# Patient Record
Sex: FEMALE | Race: WHITE | HISPANIC OR LATINO | Employment: FULL TIME | URBAN - METROPOLITAN AREA
[De-identification: names, ages, dates, MRNs, and addresses within clinical notes are randomized per-mention and may not be internally consistent; named-entity substitution may affect disease eponyms.]

---

## 2023-08-07 ENCOUNTER — OFFICE VISIT (OUTPATIENT)
Dept: OBGYN | Facility: CLINIC | Age: 28
End: 2023-08-07
Payer: COMMERCIAL

## 2023-08-07 VITALS — SYSTOLIC BLOOD PRESSURE: 100 MMHG | WEIGHT: 126.2 LBS | DIASTOLIC BLOOD PRESSURE: 56 MMHG

## 2023-08-07 DIAGNOSIS — O35.9XX0 KNOWN FETAL ANOMALY, ANTEPARTUM, SINGLE OR UNSPECIFIED FETUS: ICD-10-CM

## 2023-08-07 DIAGNOSIS — Z03.72 PLACENTAL PROBLEM SUSPECTED BUT NOT FOUND: ICD-10-CM

## 2023-08-07 DIAGNOSIS — Z3A.25 25 WEEKS GESTATION OF PREGNANCY: ICD-10-CM

## 2023-08-07 PROCEDURE — 3074F SYST BP LT 130 MM HG: CPT | Performed by: OBSTETRICS & GYNECOLOGY

## 2023-08-07 PROCEDURE — 76817 TRANSVAGINAL US OBSTETRIC: CPT | Performed by: OBSTETRICS & GYNECOLOGY

## 2023-08-07 PROCEDURE — 99202 OFFICE O/P NEW SF 15 MIN: CPT | Performed by: OBSTETRICS & GYNECOLOGY

## 2023-08-07 PROCEDURE — 3078F DIAST BP <80 MM HG: CPT | Performed by: OBSTETRICS & GYNECOLOGY

## 2023-08-07 PROCEDURE — 76811 OB US DETAILED SNGL FETUS: CPT | Performed by: OBSTETRICS & GYNECOLOGY

## 2023-08-21 NOTE — PROGRESS NOTES
"This is a 27 yrs year old  Ab0 referred for consultation regarding low lying placenta and pyelectasis seen in previous examination of 23.  She has not done a NIPT.   Her MCV is 92.3.      DISCUSSION:   She was informed of the finding, pyelectasis which is seen in 3% of ultrasounds and affects male more commonly the females 4:1.  The most common etiology is reflux.  In over 90% of cases, this will resolve by the  period.  We discussed the role of serial ultrasound to monitor this condition.       Pyelectasis is a \"soft sign\" for T21 which means pyelectasis can be seen in fetuses who are normal but also in those with T21. It is not diagnostic for T21.  She has not had aneuploidy screening.   We discussed the detection rate of T21 by NIPT is very high (>99.8%).  She will consider this as an option but declining for now.     PREVIOUS FINDINGS OF LOW LYING PLACENTA:  The previous findings of a low lying placenta could not be confirmed.  We discussed this is common as the placenta, with increasing gestational age will appear to \"move away\" from the internal os.      ASSESSMENT:  Bilateral pyelectasis.  Low lying placenta has resolved.    New outpatient expanded problem focused visit with straight forward decision making.  "

## 2023-10-03 ENCOUNTER — ANCILLARY PROCEDURE (OUTPATIENT)
Dept: MATERNAL FETAL MEDICINE | Facility: MEDICAL CENTER | Age: 28
End: 2023-10-03
Payer: OTHER MISCELLANEOUS

## 2023-10-03 VITALS — WEIGHT: 134.5 LBS | SYSTOLIC BLOOD PRESSURE: 104 MMHG | DIASTOLIC BLOOD PRESSURE: 63 MMHG

## 2023-10-03 DIAGNOSIS — Z3A.33 33 WEEKS GESTATION OF PREGNANCY: ICD-10-CM

## 2023-10-03 DIAGNOSIS — O35.EXX0 ENCOUNTER FOR REPEAT ULTRASOUND OF FETAL PYELECTASIS, ANTEPARTUM, SINGLE OR UNSPECIFIED FETUS: ICD-10-CM

## 2023-10-03 DIAGNOSIS — O35.EXX0 ENCOUNTER FOR REPEAT ULTRASOUND OF FETAL PYELECTASIS, ANTEPARTUM, NOT APPLICABLE OR UNSPECIFIED FETUS: Primary | ICD-10-CM

## 2023-10-03 PROCEDURE — 76816 OB US FOLLOW-UP PER FETUS: CPT | Performed by: OBSTETRICS & GYNECOLOGY
